# Patient Record
Sex: MALE | Race: WHITE | Employment: FULL TIME | URBAN - METROPOLITAN AREA
[De-identification: names, ages, dates, MRNs, and addresses within clinical notes are randomized per-mention and may not be internally consistent; named-entity substitution may affect disease eponyms.]

---

## 2024-07-03 ENCOUNTER — HOSPITAL ENCOUNTER (EMERGENCY)
Age: 29
Discharge: HOME | End: 2024-07-04
Attending: STUDENT IN AN ORGANIZED HEALTH CARE EDUCATION/TRAINING PROGRAM
Payer: COMMERCIAL

## 2024-07-03 ENCOUNTER — APPOINTMENT (OUTPATIENT)
Dept: GENERAL RADIOLOGY | Age: 29
End: 2024-07-03
Payer: COMMERCIAL

## 2024-07-03 VITALS
OXYGEN SATURATION: 100 % | WEIGHT: 135 LBS | BODY MASS INDEX: 21.19 KG/M2 | HEIGHT: 67 IN | TEMPERATURE: 98.6 F | HEART RATE: 73 BPM | RESPIRATION RATE: 14 BRPM

## 2024-07-03 DIAGNOSIS — S52.502A CLOSED FRACTURE OF DISTAL END OF LEFT RADIUS, UNSPECIFIED FRACTURE MORPHOLOGY, INITIAL ENCOUNTER: Primary | ICD-10-CM

## 2024-07-03 PROCEDURE — 73110 X-RAY EXAM OF WRIST: CPT

## 2024-07-03 PROCEDURE — 99283 EMERGENCY DEPT VISIT LOW MDM: CPT

## 2024-07-03 ASSESSMENT — PAIN SCALES - GENERAL: PAINLEVEL_OUTOF10: 3

## 2024-07-03 ASSESSMENT — PAIN - FUNCTIONAL ASSESSMENT: PAIN_FUNCTIONAL_ASSESSMENT: 0-10

## 2024-07-04 PROCEDURE — 6370000000 HC RX 637 (ALT 250 FOR IP): Performed by: STUDENT IN AN ORGANIZED HEALTH CARE EDUCATION/TRAINING PROGRAM

## 2024-07-04 RX ORDER — HYDROCODONE BITARTRATE AND ACETAMINOPHEN 5; 325 MG/1; MG/1
1 TABLET ORAL ONCE
Status: COMPLETED | OUTPATIENT
Start: 2024-07-04 | End: 2024-07-04

## 2024-07-04 RX ORDER — HYDROCODONE BITARTRATE AND ACETAMINOPHEN 5; 325 MG/1; MG/1
1 TABLET ORAL EVERY 6 HOURS PRN
Qty: 12 TABLET | Refills: 0 | Status: SHIPPED | OUTPATIENT
Start: 2024-07-04 | End: 2024-07-07

## 2024-07-04 RX ADMIN — HYDROCODONE BITARTRATE AND ACETAMINOPHEN 1 TABLET: 5; 325 TABLET ORAL at 01:16

## 2024-07-04 NOTE — ED NOTES
Splint applied left arm. Verified placement is correct by Dr. Moreno. Tolerated. Sling applied or comfort.

## 2024-07-06 NOTE — ED PROVIDER NOTES
[facility] EMERGENCY DEPARTMENT ENCOUNTER      Pt Name: Roldan Lora  MRN: [id number]  Birthdate 1995  Date of evaluation: 7/6/24    CHIEF COMPLAINT       Chief Complaint   Patient presents with    Wrist Injury     Left wrist injury and hand after fall at work.        HISTORY OF PRESENT ILLNESS   Roldan Lora is a 28 y.o. male who presents with pain to the Left wrist.  Pain started after injury.  Denies any loss of consciousness.  States he slipped and fell at work landing on wrist.    PASTMEDICAL HISTORY   History reviewed. No pertinent past medical history.  Past Problem List  There is no problem list on file for this patient.      SURGICAL HISTORY       Past Surgical History:   Procedure Laterality Date    APPENDECTOMY         CURRENT MEDICATIONS       Discharge Medication List as of 7/4/2024  2:26 AM          ALLERGIES     is allergic to morphine.    FAMILY HISTORY     has no family status information on file.        SOCIAL HISTORY       Social History     Tobacco Use    Smoking status: Never    Smokeless tobacco: Never   Substance Use Topics    Alcohol use: Never    Drug use: Yes     Types: Marijuana (Weed)       PHYSICAL EXAM     INITIAL VITALS: Pulse 73   Temp 98.6 °F (37 °C) (Oral)   Resp 14   Ht 1.702 m (5' 7\")   Wt 61.2 kg (135 lb)   SpO2 100%   BMI 21.14 kg/m²    Physical Exam  Vitals and nursing note reviewed.   Constitutional:       General: He is not in acute distress.     Appearance: He is well-developed. He is not toxic-appearing.   HENT:      Head: Normocephalic and atraumatic.      Nose: Nose normal.      Mouth/Throat:      Mouth: Mucous membranes are moist.   Eyes:      General: No scleral icterus.     Conjunctiva/sclera: Conjunctivae normal.      Pupils: Pupils are equal, round, and reactive to light.   Cardiovascular:      Rate and Rhythm: Normal rate and regular rhythm.   Pulmonary:      Effort: Pulmonary effort is normal. No respiratory distress.   Musculoskeletal:      Comments:

## 2024-07-09 ENCOUNTER — OFFICE VISIT (OUTPATIENT)
Age: 29
End: 2024-07-09

## 2024-07-09 VITALS — BODY MASS INDEX: 21.19 KG/M2 | HEIGHT: 67 IN | WEIGHT: 135 LBS

## 2024-07-09 DIAGNOSIS — S52.502A CLOSED FRACTURE OF DISTAL END OF LEFT RADIUS, UNSPECIFIED FRACTURE MORPHOLOGY, INITIAL ENCOUNTER: Primary | ICD-10-CM

## 2024-07-09 SDOH — HEALTH STABILITY: PHYSICAL HEALTH: ON AVERAGE, HOW MANY MINUTES DO YOU ENGAGE IN EXERCISE AT THIS LEVEL?: 40 MIN

## 2024-07-09 SDOH — HEALTH STABILITY: PHYSICAL HEALTH: ON AVERAGE, HOW MANY DAYS PER WEEK DO YOU ENGAGE IN MODERATE TO STRENUOUS EXERCISE (LIKE A BRISK WALK)?: 4 DAYS

## 2024-07-09 NOTE — PROGRESS NOTES
Food Insecurity: Not on file   Transportation Needs: Not on file   Physical Activity: Sufficiently Active (7/9/2024)    Exercise Vital Sign     Days of Exercise per Week: 4 days     Minutes of Exercise per Session: 40 min   Stress: Not on file   Social Connections: Not on file   Intimate Partner Violence: Not on file   Housing Stability: Not on file     No past medical history on file.  Past Surgical History:   Procedure Laterality Date    APPENDECTOMY       No family history on file.     Cat Norman PA-C    Please note that this chart was generated using voice recognition Dragon dictation software. Although every effort was made to ensure the accuracy of this automated transcription, some errors in transcription may have occurred.

## 2024-08-07 ENCOUNTER — OFFICE VISIT (OUTPATIENT)
Age: 29
End: 2024-08-07

## 2024-08-07 DIAGNOSIS — S52.502D CLOSED FRACTURE OF DISTAL END OF LEFT RADIUS WITH ROUTINE HEALING, UNSPECIFIED FRACTURE MORPHOLOGY, SUBSEQUENT ENCOUNTER: Primary | ICD-10-CM

## 2024-08-07 PROCEDURE — 99024 POSTOP FOLLOW-UP VISIT: CPT

## 2024-08-07 NOTE — PROGRESS NOTES
[facility] Orthopaedics & Sports Medicine      [facility]THOPAED[facility] AND SPORTS MEDICINE  [address]  [place] [address]  Dept: [phone]  Dept Fax: [phone]    Chief Compliant:  Chief Complaint   Patient presents with    Fracture        History of Present Illness:  This is a pleasant 28 y.o. male who presents to the clinic today for evaluation / follow up of left distal radius fracture.  Patient is doing well this point.  He has been in a cock up wrist splint for the past 4 weeks.  He states he has not been taking any medications for pain.  He has no major concerns at this point.  He does state that his range of motion is still a bit tight.  He presents today for further evaluation.      Physical Exam:    Left hand/wrist: Patient is able flex extend the wrist however is tight in both of these ranges of motion.  He can radially and ulnarly deviate the wrist.  He can form a fist and extend his fingers.  He does have ecchymosis present on exam today.  He has good strength to resisted finger flexion.  Overall sensation is intact to light touch.    Nursing note and vitals reviewed.     Labs and Imagin views of the left wrist taken today show extra-articular transverse fracture of the distal radius.  There is good interval healing present.  Callus formation appreciated.  No new acute bony abnormalities present.       Orders Placed This Encounter   Procedures    XR WRIST LEFT (2 VIEWS)       Assessment and Plan:  1. Closed fracture of distal end of left radius with routine healing, unspecified fracture morphology, subsequent encounter          This is a pleasant 28 y.o. male with extra-articular left distal radius fracture.  Patient is doing well at this point.  At this point I am fine with him weaning himself out of the cock up wrist splint.  At this point I am fine with him returning to work however I do not want

## 2024-08-26 DIAGNOSIS — S52.502D CLOSED FRACTURE OF DISTAL END OF LEFT RADIUS WITH ROUTINE HEALING, UNSPECIFIED FRACTURE MORPHOLOGY, SUBSEQUENT ENCOUNTER: Primary | ICD-10-CM

## 2024-08-29 ENCOUNTER — HOSPITAL ENCOUNTER (OUTPATIENT)
Dept: OCCUPATIONAL THERAPY | Facility: CLINIC | Age: 29
Setting detail: THERAPIES SERIES
Discharge: HOME | End: 2024-08-29
Payer: COMMERCIAL

## 2024-08-29 PROCEDURE — 97016 VASOPNEUMATIC DEVICE THERAPY: CPT

## 2024-08-29 PROCEDURE — 97110 THERAPEUTIC EXERCISES: CPT

## 2024-08-29 PROCEDURE — 97165 OT EVAL LOW COMPLEX 30 MIN: CPT

## 2024-08-29 NOTE — CONSULTS
[] [facility]  Outpatient Rehabilitation &  Therapy  [address]  [phone]  F: [phone] [] [facility]  Outpatient Rehabilitation &  Therapy  [address]   [address]  P: [phone]  F: [phone] [x] [facility]  Outpatient Rehabilitation &  Therapy  [address]  P: [phone]  F: [phone] [] [facility]  Outpatient Rehabilitation &  Therapy  [address]   P: [phone]  F: [phone] [] [facility]   Outpatient Rehabilitation   & Therapy  [address]  P: [phone]   F: [phone]     Occupational Therapy Hand & Upper Extremity  Initial Evaluation    Date: 2024      Patient: Roldan Lora  : 1995  MRN: [id number]  Referring Provider:  Cat Norman PA-C   Insurance: [employer] W/C; Received approved C-9 for 18vs 2-3x weekly for 4-6 weeks of OT from -24   Medical Diagnosis: S52.502D (ICD-10-CM) - Closed fracture of distal end of left radius with routine healing, unspecified fracture morphology, subsequent encounter    Rehab Codes: pain in wrist M25.53,, muscle wasting of forearm M62.53,, or muscle wasting of hand M62.54,   Onset Date: 7/3/24    Next  Appt: TBD    Subjective:   CC: pn and lack of motion in L wrist  HPI: Pt reports he was at work when he slipped on the floor and fell backwards onto his L hand arm. Pt states he felt pn right away. Pt reports he went to Ed shortly after his fall and was placed in a hard cast for about a week and then switched to a removable brace. Pt reports he has been starting to get back to work and has bee having some pn w/ activities.    Mechanism of Injury: FOOSH  Surgery Date: N/A  Precautions: None    Involved Extremity: Left   Dominant Extremity: Right    Past Medical History: Other hx of fx to L UE           Comorbidities: NA    Medications: None Allergies:  Refer to Medical chart in  tested    UE ROM/MMT   Right Left MMT Right MMT Left   Elbow       Forearm Pronation (80-90) 90 85 (94%) 5/5 4/5   Forearm Supination (80-90)  90 37 (41%) 5/5 4/5   Wrist       Flexion (70-80) 72 75 5/5 5/5   Extension (60-70) 75 64 (85%) 5/5 5/5   Radial Deviation (20-25) 16 16 5/5 5/5   Ulnar Deviation (30-40) 35 25 (71%) 5/5 5/5    (0 is used for neutral, + is used for hyperextension, - is used for extensor lag per ASHT)    STRENGTH   Right   (pounds) Left   (pounds)    position 1 48 24 (50%)    position 2 98 39 (39%)   Lateral pinch 20 10 (50%)   2 point pinch 11 5 (45%)   3 jaw pinch 14 8 (57%)     Bilateral  strength is normally symmetrical or up to 10% stronger on the dominant extremity, depending on the individual's physically activity level.     Problems: Pain, ROM, Strength, and Function     Assessment:      Patient would benefit from skilled occupational therapy services in order to: Improve  ROM, Strength, Activity tolerance, and Complaint of pain in order to Ensure safe return to work, improve functional use of UE in ADL performance, decrease pain in UE for safe completion of ADLs, and return to PLOF     Short Term Goals: (  9    Treatments)  Decrease Pain by 1 point on numeric pain scale in L wrist   Increase AROM in the L wrist   Extension by 5 degrees  Supination by 20 degrees   Increase  strength in L hand by 20lbs  Increase pinch strength in L hand by 3lbs  Increase function:UE Functional Index Score to  50/80 or more points to promote increased functional abilities  Patient to be independent with home exercise program as demonstrated by performance with correct form without cues.    Long Term Goals: (  18  Treatments)  Decrease Pain by 2 point on numeric pain scale in L wrist   Increase AROM in the L wrist   Extension by 10 degrees  Supination by 45 degrees   Increase  strength in L hand by 35lbs  Increase pinch strength in L hand by 5lbs  Increase function:UE Functional Index

## 2024-09-09 ENCOUNTER — HOSPITAL ENCOUNTER (OUTPATIENT)
Dept: OCCUPATIONAL THERAPY | Facility: CLINIC | Age: 29
Setting detail: THERAPIES SERIES
Discharge: HOME | End: 2024-09-09
Payer: COMMERCIAL

## 2024-09-09 PROCEDURE — 97110 THERAPEUTIC EXERCISES: CPT

## 2024-09-09 PROCEDURE — 97140 MANUAL THERAPY 1/> REGIONS: CPT

## 2024-09-11 ENCOUNTER — HOSPITAL ENCOUNTER (OUTPATIENT)
Dept: OCCUPATIONAL THERAPY | Facility: CLINIC | Age: 29
Setting detail: THERAPIES SERIES
Discharge: HOME | End: 2024-09-11
Payer: COMMERCIAL

## 2024-09-11 PROCEDURE — 97110 THERAPEUTIC EXERCISES: CPT

## 2024-09-11 PROCEDURE — 97140 MANUAL THERAPY 1/> REGIONS: CPT

## 2024-09-16 ENCOUNTER — HOSPITAL ENCOUNTER (OUTPATIENT)
Dept: OCCUPATIONAL THERAPY | Facility: CLINIC | Age: 29
Setting detail: THERAPIES SERIES
Discharge: HOME | End: 2024-09-16
Payer: COMMERCIAL

## 2024-09-16 PROCEDURE — 97110 THERAPEUTIC EXERCISES: CPT

## 2024-09-18 ENCOUNTER — HOSPITAL ENCOUNTER (OUTPATIENT)
Dept: OCCUPATIONAL THERAPY | Facility: CLINIC | Age: 29
Setting detail: THERAPIES SERIES
Discharge: HOME | End: 2024-09-18
Payer: COMMERCIAL

## 2024-09-18 PROCEDURE — 97110 THERAPEUTIC EXERCISES: CPT

## 2024-09-23 ENCOUNTER — HOSPITAL ENCOUNTER (OUTPATIENT)
Dept: OCCUPATIONAL THERAPY | Facility: CLINIC | Age: 29
Setting detail: THERAPIES SERIES
Discharge: HOME | End: 2024-09-23
Payer: COMMERCIAL

## 2024-09-23 PROCEDURE — 97110 THERAPEUTIC EXERCISES: CPT

## 2024-09-25 ENCOUNTER — HOSPITAL ENCOUNTER (OUTPATIENT)
Dept: OCCUPATIONAL THERAPY | Facility: CLINIC | Age: 29
Setting detail: THERAPIES SERIES
Discharge: HOME | End: 2024-09-25
Payer: COMMERCIAL

## 2024-09-25 PROCEDURE — 97110 THERAPEUTIC EXERCISES: CPT

## 2024-09-30 ENCOUNTER — HOSPITAL ENCOUNTER (OUTPATIENT)
Dept: OCCUPATIONAL THERAPY | Facility: CLINIC | Age: 29
Setting detail: THERAPIES SERIES
Discharge: HOME | End: 2024-09-30
Payer: COMMERCIAL

## 2024-09-30 PROCEDURE — 97110 THERAPEUTIC EXERCISES: CPT

## 2024-09-30 PROCEDURE — 97140 MANUAL THERAPY 1/> REGIONS: CPT

## 2024-09-30 NOTE — FLOWSHEET NOTE
[] [facility]       Occupational Therapy            1st floor       [address] [place], [place]         Phone: [phone]       Fax: [phone] [x] [facility] Hand Rehab   [facility]  [address], [place]  Phone: [phone]  Fax: [phone] [] [facility]  Outpatient Rehabilitation &  Therapy  [address]   P: [phone]  F: [phone]     Occupational Therapy Daily Treatment Note    Date:  2024  Patient Name:  Roldan Lora    :  1995  MRN: [id number]  Referring Provider:  Cat Norman PA-C   Insurance: [employer] W/C; Received approved C-9 for 18vs 2-3x weekly for 4-6 weeks of OT from -24   Medical Diagnosis: S52.502D (ICD-10-CM) - Closed fracture of distal end of left radius with routine healing, unspecified fracture morphology, subsequent encounter         Rehab Codes: pain in wrist M25.53,, muscle wasting of forearm M62.53,, or muscle wasting of hand M62.54,   Onset Date: 7/3/24                   Next  Appt: TBD  Visit# / total visits: ; Progress note for Medicare patient due at visit 9    Cancels/No Shows: 0/0      Subjective:    Pain:  [] Yes  [x] No Location: L wrist   Pain Rating: (0-10 scale) 0/10  Pain altered Tx:  [] No  [] Yes  Action:  Pt Comments: Pt reports no pn in the L wrist.    Objective:  Modalities:  Precautions:  Exercises:  Exercise Reps/Time Weight/Level Comments Completed    Wrist AROM 3x10 AROM Flex/ext -   Putty gripping 4 mins Red  added X   Banded wrist strengthening  3x10 orange Flexion/ext  Pronation/supination -    Metal gripper 3x10 50lbs   X   Digi-flex 3x10 Blue  X    Power web  3x10 ea red , press, steering wheel   X   Pronation/supination 2x10 3lbs  X   Flexbar  Wrist Flexion/Ext  Wrist pronation  Wrist supination  Wrist ulnar deviation  Wrist radial dev 2x10 Green  X   Pronated wrist curl 2x10 1kg Red ball X   Ball toss 3x10 1kg  X   Tennis ball squeeze  30

## 2024-10-02 ENCOUNTER — HOSPITAL ENCOUNTER (OUTPATIENT)
Dept: OCCUPATIONAL THERAPY | Facility: CLINIC | Age: 29
Setting detail: THERAPIES SERIES
Discharge: HOME | End: 2024-10-02
Payer: COMMERCIAL

## 2024-10-02 PROCEDURE — 97110 THERAPEUTIC EXERCISES: CPT

## 2024-10-02 NOTE — FLOWSHEET NOTE
[] [facility]       Occupational Therapy            1st floor       [address] [place], [place]         Phone: [phone]       Fax: [phone] [x] [facility]   [facility]  [address] [place], [place]  Phone: [phone]  Fax: [phone] [] [facility]  Outpatient Rehabilitation &  Therapy  [address]   P: [phone]  F: [phone]     Occupational Therapy Daily Treatment Note    Date:  10/2/2024  Patient Name:  Roldan Lora    :  1995  MRN: [id number]  Referring Provider:  Cat Norman PA-C   Insurance: [employer] W/C; Received approved C-9 for 18vs 2-3x weekly for 4-6 weeks of OT from -24   Medical Diagnosis: S52.502D (ICD-10-CM) - Closed fracture of distal end of left radius with routine healing, unspecified fracture morphology, subsequent encounter         Rehab Codes: pain in wrist M25.53,, muscle wasting of forearm M62.53,, or muscle wasting of hand M62.54,   Onset Date: 7/3/24                   Next  Appt: TBD  Visit# / total visits: ; Progress note for Medicare patient due at visit 9    Cancels/No Shows: 0/0      Subjective:    Pain:  [] Yes  [x] No Location: L wrist   Pain Rating: (0-10 scale) 0/10  Pain altered Tx:  [] No  [] Yes  Action:  Pt Comments: Refer to progress note for status.  Objective:  Modalities:  Precautions:  Exercises:  Exercise Reps/Time Weight/Level Comments Completed    Wrist AROM 3x10 AROM Flex/ext -   Putty gripping 4 mins Red  added HEP   Banded wrist strengthening  3x10 orange Flexion/ext  Pronation/supination -    Metal gripper 3x10 50lbs isolate  X   Digi-flex 3x10 Blue  X    Power web  3x10 ea red , press, steering wheel   X   Pronation/supination 3x10 3lbs  X   Flexbar  Wrist Flexion/Ext  Wrist pronation  Wrist supination  Wrist ulnar deviation  Wrist radial dev 2x10 Green  X   Pronated wrist curl 2x10 1kg Red ball X   Ball toss 3x10 1kg  X   Tennis ball  Ambulance

## 2024-10-02 NOTE — PROGRESS NOTES
ASHT)    STRENGTH    Right   (pounds) Left   (pounds)    position 1 48 32 (66%)      position 2 98 41 (41%)     Lateral pinch 20 10 (65%)    2 point pinch 11 8 (72%)    3 jaw pinch 14 12 (85%)       Bilateral  strength is normally symmetrical or up to 10% stronger on the dominant extremity, depending on the individual's physically activity level.     Function:   Current Functional Level: 78/80   functionally impaired as measured with the Upper Extremity Functional Index Survey.  0-80 scale, with 80 = no Deficits  (The UEFI model does not provide any specific cut off points that could classify the upper limb disability degree, however, a minimal detectable change of 9 points is provided.This means that for improvement or deterioration to be considered, between two subsequent evaluations, the scores must differ by at least 9 points.)       Assessment: Pt has progressed very well through OT. Pt's  and pinch strength in the L UE has improved since initial eval. Pt's AROM in the L wrist has made a drastic increased over the last 9 visits. Pt's UEFI score improve to 78/80 (97%). POC to place pt on hold for 2/wks, pt will contact clinic if he wishes to return to OT.     Short Term Goals: (  9    Treatments)  Decrease Pain by 1 point on numeric pain scale in L wrist MET  Increase AROM in the L wrist   Extension by 5 degrees MET  Supination by 20 degrees  MET  Increase  strength in L hand by 20lbs  Increase pinch strength in L hand by 3lbs MET  Increase function:UE Functional Index Score to  50/80 or more points to promote increased functional abilities MET  Patient to be independent with home exercise program as demonstrated by performance with correct form without cues. MET      Long Term Goals: (  18  Treatments)  Decrease Pain by 2 point on numeric pain scale in L wrist   Increase AROM in the L wrist   Extension by 10 degrees  Supination by 45 degrees   Increase  strength in L hand by